# Patient Record
Sex: FEMALE | Race: WHITE
[De-identification: names, ages, dates, MRNs, and addresses within clinical notes are randomized per-mention and may not be internally consistent; named-entity substitution may affect disease eponyms.]

---

## 2024-08-27 PROBLEM — Z00.00 ENCOUNTER FOR PREVENTIVE HEALTH EXAMINATION: Status: ACTIVE | Noted: 2024-08-27

## 2024-09-10 PROBLEM — Z85.118: Status: RESOLVED | Noted: 2024-09-10 | Resolved: 2024-09-10

## 2024-09-10 PROBLEM — Z87.891 FORMER SMOKER: Status: ACTIVE | Noted: 2024-09-10

## 2024-09-10 PROBLEM — Z92.21 HISTORY OF CHEMOTHERAPY: Status: RESOLVED | Noted: 2024-09-10 | Resolved: 2024-09-10

## 2024-09-10 PROBLEM — Z92.3 HISTORY OF RADIATION THERAPY: Status: RESOLVED | Noted: 2024-09-10 | Resolved: 2024-09-10

## 2024-09-10 RX ORDER — MAGNESIUM HYDROXIDE 1200 MG
TABLET,CHEWABLE ORAL
Refills: 0 | Status: ACTIVE | COMMUNITY

## 2024-09-10 RX ORDER — PEMBROLIZUMAB 25 MG/ML
INJECTION, SOLUTION INTRAVENOUS
Refills: 0 | Status: ACTIVE | COMMUNITY

## 2024-09-10 RX ORDER — PEMETREXED 25 MG/ML
SOLUTION, CONCENTRATE INTRAVENOUS
Refills: 0 | Status: ACTIVE | COMMUNITY

## 2024-09-10 RX ORDER — CHROMIUM 200 MCG
TABLET ORAL
Refills: 0 | Status: ACTIVE | COMMUNITY

## 2024-09-10 RX ORDER — PREDNISONE 10 MG/1
10 TABLET ORAL
Refills: 0 | Status: ACTIVE | COMMUNITY

## 2024-09-10 RX ORDER — FLUTICASONE FUROATE, UMECLIDINIUM BROMIDE AND VILANTEROL TRIFENATATE 200; 62.5; 25 UG/1; UG/1; UG/1
POWDER RESPIRATORY (INHALATION)
Refills: 0 | Status: ACTIVE | COMMUNITY

## 2024-09-10 RX ORDER — ALBUTEROL 90 MCG
AEROSOL (GRAM) INHALATION
Refills: 0 | Status: ACTIVE | COMMUNITY

## 2024-09-10 RX ORDER — DEXTROMETHORPHAN POLISTIREX 30 MG/5 ML
SUSPENSION, EXTENDED RELEASE 12 HR ORAL
Refills: 0 | Status: ACTIVE | COMMUNITY

## 2024-09-10 RX ORDER — LACTOBACILLUS ACIDOPHILUS/PECT 30 MG-20MG
TABLET ORAL
Refills: 0 | Status: ACTIVE | COMMUNITY

## 2024-09-10 RX ORDER — PNV NO.95/FERROUS FUM/FOLIC AC 28MG-0.8MG
TABLET ORAL
Refills: 0 | Status: ACTIVE | COMMUNITY

## 2024-09-13 ENCOUNTER — APPOINTMENT (OUTPATIENT)
Dept: RADIATION ONCOLOGY | Facility: CLINIC | Age: 64
End: 2024-09-13
Payer: COMMERCIAL

## 2024-09-13 VITALS
OXYGEN SATURATION: 100 % | WEIGHT: 122 LBS | DIASTOLIC BLOOD PRESSURE: 87 MMHG | RESPIRATION RATE: 16 BRPM | HEART RATE: 71 BPM | HEIGHT: 64 IN | BODY MASS INDEX: 20.83 KG/M2 | SYSTOLIC BLOOD PRESSURE: 135 MMHG | TEMPERATURE: 98 F

## 2024-09-13 DIAGNOSIS — Z92.21 PERSONAL HISTORY OF ANTINEOPLASTIC CHEMOTHERAPY: ICD-10-CM

## 2024-09-13 DIAGNOSIS — Z92.3 PERSONAL HISTORY OF IRRADIATION: ICD-10-CM

## 2024-09-13 DIAGNOSIS — Z85.118 PERSONAL HISTORY OF OTHER MALIGNANT NEOPLASM OF BRONCHUS AND LUNG: ICD-10-CM

## 2024-09-13 DIAGNOSIS — Z87.891 PERSONAL HISTORY OF NICOTINE DEPENDENCE: ICD-10-CM

## 2024-09-13 PROCEDURE — 99205 OFFICE O/P NEW HI 60 MIN: CPT

## 2024-09-13 PROCEDURE — G2211 COMPLEX E/M VISIT ADD ON: CPT | Mod: NC

## 2024-09-13 NOTE — HISTORY OF PRESENT ILLNESS
[FreeTextEntry1] : Ms. 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 Patient was initially treated with carboplatin, pemetrexed, and pembrolizumab; now on maintenance pemetrexed and Keytruda.   Radiation history:  1. HEIDI & mediastinal lymph nodes - 32 fractions to a total dose of 6400 cGy completed on 8/7/23  2. Right occipital brain lesion - SRS 1 single fraction to a dose of 2200 CGy on 6/6/23  3. whole brain radiation - 10 fractions to a total dose of 3000 cGy completed on 2/2/24.   Patient has a history of 44-pack-year history, noted to have a new HEIDI nodule measuring 0.3cm and a stable 0.4cm nodule along the medial left major fissure with no associated lymphadenopathy on a screening CT scan in January 2021.   4/10/23 CT Chest ([facility]) showed multiple lung nodules including a new anterior HEIDI measuring 2.1 x 3.6cm, central HEIDI measuring 1.4 x 1.9cm significantly larger, posterior medial HEIDI 3x3mm slightly larger compared to previous exam. No significant mediastinal or hilar lymphadenopathy.    In April 2023, patient developed headaches. Her MRI on 4/17/23 showed a right occipital 1.3cm lesion with vasogenic edema.   5/1/23 Repeat MRI showed a 1.6cm enhancing mass in the right posterior parasaggital occipital region suspicious for metastasis.   5/3/23 PET/CT ([facility]) showed irregular masslike consolidation within the HEIDI with activity worrisome for malignancy. There was activity within the left hilum and subaortic lymph node.   5/10/23 HEIDI biopsy revealed adenocarcinoma.   Patient then received RT to the HEIDI & mediastinal lymph nodes completed in August 2023 and SRS to the right occipital brain lesion on 6/9/23.   9/27/23 MRI Brain ([facility]) showed resolved right occipital lobe enhancing metastasis. Trace residual enhancement region may reflect treatment-realted changes or trace residual neoplasm. Resolved surrounding edema. No new intracranial metastasis identified.   1/11/24 MRI Brain ([place]) showed multiple bilateral supratentorial and infratentorial subcentimeter enhancing nodular lesions, suspicious for metastases. These lesions measure up to 9mm in the left cerebellar hemisphere and 5mm in the parafalcine posterior left frontal lobe. The previously treated right occipital lobe metastasis has resolved.   Patient then received whole brain radiation 10 fractions to a total dose of 3000 cGy completed on 2/2/24.   5/13/24 MRI brain ([place]) demonstrated significantly decreased burden of intracranial metastases, with resolution of most lesions and decreased size of 2 residual cerebellar lesions. No new lesions identified.   8/14/24 MRI brain ([place]) showed new 3mm metastasis in the right recebellar hemisphere, and slightly increased size of a now 7mm metastasis in the left cerebellar hemisphere.   9/13/24 Ms. Sun presents today to discuss GKS to her brain as referred by Dr. Sanchez.  She presented today to the office and became very unsteady on her feet and felt like she was going to pass out. We got her a wheelchair and she felt fine. She had some water and cookies, VSS, and stated that she did not have any issues with balance or dizziness prior to the visit. She did have labs and chemo on Wednesday. She is overall feeling well and denies any headaches, dizziness, n/v, or vision issues. The patient felt well throughout the visit and then when she left, she felt like she was going to pass out when she was getting up to leave. We recommended a visit to the ED to be evaluated.

## 2024-09-13 NOTE — PHYSICAL EXAM
[] : no respiratory distress [Respiration, Rhythm And Depth] : normal respiratory rhythm and effort [Exaggerated Use Of Accessory Muscles For Inspiration] : no accessory muscle use [Abdomen Soft] : soft [Nondistended] : nondistended [Abdomen Tenderness] : non-tender [No Focal Deficits] : no focal deficits [Sensation] : the sensory exam was normal to light touch and pinprick [Motor Exam] : the motor exam was normal [Normal] : oriented to person, place and time, the affect was normal, the mood was normal and not anxious [de-identified] : no loss of balance on exam today; no positive cerebellar signs

## 2024-09-13 NOTE — REVIEW OF SYSTEMS
[Difficulty Walking] : difficulty walking [Negative] : Allergic/Immunologic [FreeTextEntry2] : lightheaded [de-identified] : occasional loss of balance

## 2024-09-13 NOTE — HISTORY OF PRESENT ILLNESS
[FreeTextEntry1] : Ms. 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 Patient was initially treated with carboplatin, pemetrexed, and pembrolizumab; now on maintenance pemetrexed and Keytruda.   Radiation history:  1. HEIDI & mediastinal lymph nodes - 32 fractions to a total dose of 6400 cGy completed on 8/7/23  2. Right occipital brain lesion - SRS 1 single fraction to a dose of 2200 CGy on 6/6/23  3. whole brain radiation - 10 fractions to a total dose of 3000 cGy completed on 2/2/24.   Patient has a history of 44-pack-year history, noted to have a new HEIDI nodule measuring 0.3cm and a stable 0.4cm nodule along the medial left major fissure with no associated lymphadenopathy on a screening CT scan in January 2021.   4/10/23 CT Chest ([facility] [place]) showed multiple lung nodules including a new anterior HEIDI measuring 2.1 x 3.6cm, central HEIDI measuring 1.4 x 1.9cm significantly larger, posterior medial HEIDI 3x3mm slightly larger compared to previous exam. No significant mediastinal or hilar lymphadenopathy.    In April 2023, patient developed headaches. Her MRI on 4/17/23 showed a right occipital 1.3cm lesion with vasogenic edema.   5/1/23 Repeat MRI showed a 1.6cm enhancing mass in the right posterior parasaggital occipital region suspicious for metastasis.   5/3/23 PET/CT ([facility] [place]) showed irregular masslike consolidation within the HEIDI with activity worrisome for malignancy. There was activity within the left hilum and subaortic lymph node.   5/10/23 HEIDI biopsy revealed adenocarcinoma.   Patient then received RT to the HEIDI & mediastinal lymph nodes completed in August 2023 and SRS to the right occipital brain lesion on 6/9/23.   9/27/23 MRI Brain ([facility] [place]) showed resolved right occipital lobe enhancing metastasis. Trace residual enhancement region may reflect treatment-realted changes or trace residual neoplasm. Resolved surrounding edema. No new intracranial metastasis identified.   1/11/24 MRI Brain ([place]) showed multiple bilateral supratentorial and infratentorial subcentimeter enhancing nodular lesions, suspicious for metastases. These lesions measure up to 9mm in the left cerebellar hemisphere and 5mm in the parafalcine posterior left frontal lobe. The previously treated right occipital lobe metastasis has resolved.   Patient then received whole brain radiation 10 fractions to a total dose of 3000 cGy completed on 2/2/24.   5/13/24 MRI brain ([place]) demonstrated significantly decreased burden of intracranial metastases, with resolution of most lesions and decreased size of 2 residual cerebellar lesions. No new lesions identified.   8/14/24 MRI brain ([place]) showed new 3mm metastasis in the right recebellar hemisphere, and slightly increased size of a now 7mm metastasis in the left cerebellar hemisphere.   9/13/24 Ms. Sun presents today to discuss GKS to her brain as referred by Dr. Sanchez.  She presented today to the office and became very unsteady on her feet and felt like she was going to pass out. We got her a wheelchair and she felt fine. She had some water and cookies, VSS, and stated that she did not have any issues with balance or dizziness prior to the visit. She did have labs and chemo on Wednesday. She is overall feeling well and denies any headaches, dizziness, n/v, or vision issues. The patient felt well throughout the visit and then when she left, she felt like she was going to pass out when she was getting up to leave. We recommended a visit to the ED to be evaluated.

## 2024-09-13 NOTE — REVIEW OF SYSTEMS
[Difficulty Walking] : difficulty walking [Negative] : Allergic/Immunologic [FreeTextEntry2] : lightheaded [de-identified] : occasional loss of balance

## 2024-09-13 NOTE — PHYSICAL EXAM
[] : no respiratory distress [Respiration, Rhythm And Depth] : normal respiratory rhythm and effort [Exaggerated Use Of Accessory Muscles For Inspiration] : no accessory muscle use [Abdomen Soft] : soft [Nondistended] : nondistended [Abdomen Tenderness] : non-tender [No Focal Deficits] : no focal deficits [Sensation] : the sensory exam was normal to light touch and pinprick [Motor Exam] : the motor exam was normal [Normal] : oriented to person, place and time, the affect was normal, the mood was normal and not anxious [de-identified] : no loss of balance on exam today; no positive cerebellar signs

## 2024-09-13 NOTE — VITALS
[Maximal Pain Intensity: 0/10] : 0/10 [70: Cares for self; unalbe to carry on normal activity or do active work.] : 70: Cares for self; unable to carry on normal activity or do active work. [Date: ____________] : Patient's last distress assessment performed on [unfilled]. [0 - No Distress] : Distress Level: 0

## 2024-09-18 ENCOUNTER — APPOINTMENT (OUTPATIENT)
Dept: NEUROSURGERY | Facility: CLINIC | Age: 64
End: 2024-09-18
Payer: COMMERCIAL

## 2024-09-18 DIAGNOSIS — C79.32 SECONDARY MALIGNANT NEOPLASM OF BRAIN: ICD-10-CM

## 2024-09-18 DIAGNOSIS — C79.31 SECONDARY MALIGNANT NEOPLASM OF BRAIN: ICD-10-CM

## 2024-09-18 PROCEDURE — G2211 COMPLEX E/M VISIT ADD ON: CPT | Mod: NC

## 2024-09-18 PROCEDURE — 99202 OFFICE O/P NEW SF 15 MIN: CPT

## 2024-09-19 PROBLEM — C79.31 SECONDARY MALIGNANT NEOPLASM OF BRAIN AND CEREBRAL MENINGES: Status: ACTIVE | Noted: 2024-09-13

## 2024-09-19 NOTE — END OF VISIT
[FreeTextEntry3] : I have seen the patient and reviewed the case together with PA and I agree with the final recommendations and plan of care.  Grant Ty MD Neurosurgery  [Time Spent: ___ minutes] : I have spent [unfilled] minutes of time on the encounter which excludes teaching and separately reported services.

## 2024-09-19 NOTE — ASSESSMENT
[FreeTextEntry1] : I have discussed the natural history and treatment options for brain metastatic disease with the patient. I explained the indications for medical management with antiepileptic medications and steroids, chemotherapy, radiation therapy, radiosurgery and surgery. I explained the indications of a combination of these treatment options. I discussed the risks, benefits, possible complications and expected outcome related to each treatment option.  In the end, I recommend Gamma Knife Radiosurgery to treat the patient's brain metastases. After considering the options, the patient is leaning towards treatment with Gamma Knife radiosurgery. I will coordinate with Dr. Nieves Dominguez, my partner in Radiation Oncology at [facility] to arrange for treatment. The patient understands the plan of care and is in agreement.

## 2024-09-19 NOTE — HISTORY OF PRESENT ILLNESS
[de-identified] : IGLESIA HUERTA is a 64 year female with a PMH of ex smoker, oligometastatic stage IV adenocarcinoma of the lung diagnosed in 2021 s/p RT to the left upper lobe and mediastinal lymph nodes in 8/2023 with metastases to the right occipital brain mass on 6/9/23 and WBRT in February 2024 (10 fractions to total dose of 3000 CGy). who presents to the office today at the request of Dr. Nieves Dominguez for neurosurgical consultation due to brain metastases Patient was initially treated with carboplatin, pemetrexed, and pembrolizumab; now on maintenance pemetrexed and Keytruda. The patient underwent MRI brain ([facility]) on 8/14/24 which was independently reviewed by me today and demonstrates a new 3mm metastasis in the right cerebellar hemisphere, and slightly increased size of a now 7mm metastasis in the left cerebellar hemisphere. The patient denies headaches, visual change, numbness, weakness of extremities, speech disturbance, dizziness, neck pain, vertigo. KPS: 70 Meds: calcium/vit D, dulcolax, folic acid, keytruda, pemetrexed, prednisone, proventil, trelegy, vit B Allergies: NKDA Soc Hx: exsmoker, no sig EtOH, lives with

## 2024-09-19 NOTE — HISTORY OF PRESENT ILLNESS
[de-identified] : IGLESIA HUERTA is a 64 year female with a PMH of ex smoker, oligometastatic stage IV adenocarcinoma of the lung diagnosed in 2021 s/p RT to the left upper lobe and mediastinal lymph nodes in 8/2023 with metastases to the right occipital brain mass on 6/9/23 and WBRT in February 2024 (10 fractions to total dose of 3000 CGy). who presents to the office today at the request of Dr. Nieves Dominguez for neurosurgical consultation due to brain metastases Patient was initially treated with carboplatin, pemetrexed, and pembrolizumab; now on maintenance pemetrexed and Keytruda. The patient underwent MRI brain ([facility]) on 8/14/24 which was independently reviewed by me today and demonstrates a new 3mm metastasis in the right cerebellar hemisphere, and slightly increased size of a now 7mm metastasis in the left cerebellar hemisphere. The patient denies headaches, visual change, numbness, weakness of extremities, speech disturbance, dizziness, neck pain, vertigo. KPS: 70 Meds: calcium/vit D, dulcolax, folic acid, keytruda, pemetrexed, prednisone, proventil, trelegy, vit B Allergies: NKDA Soc Hx: exsmoker, no sig EtOH, lives with

## 2024-09-19 NOTE — ASSESSMENT
[FreeTextEntry1] : I have discussed the natural history and treatment options for brain metastatic disease with the patient. I explained the indications for medical management with antiepileptic medications and steroids, chemotherapy, radiation therapy, radiosurgery and surgery. I explained the indications of a combination of these treatment options. I discussed the risks, benefits, possible complications and expected outcome related to each treatment option.  In the end, I recommend Gamma Knife Radiosurgery to treat the patient's brain metastases. After considering the options, the patient is leaning towards treatment with Gamma Knife radiosurgery. I will coordinate with Dr. Nieves Dominguez, my partner in Radiation Oncology at [facility] to arrange for treatment. The patient understands the plan of care and is in agreement.

## 2024-09-23 ENCOUNTER — RESULT REVIEW (OUTPATIENT)
Age: 64
End: 2024-09-23

## 2024-09-24 ENCOUNTER — TRANSCRIPTION ENCOUNTER (OUTPATIENT)
Age: 64
End: 2024-09-24

## 2024-09-24 ENCOUNTER — APPOINTMENT (OUTPATIENT)
Dept: NEUROSURGERY | Facility: HOSPITAL | Age: 64
End: 2024-09-24
Payer: COMMERCIAL

## 2024-09-24 PROCEDURE — 61797 SRS CRAN LES SIMPLE ADDL: CPT

## 2024-09-24 PROCEDURE — 61800 APPLY SRS HEADFRAME ADD-ON: CPT

## 2024-09-24 PROCEDURE — 61796 SRS CRANIAL LESION SIMPLE: CPT

## 2024-09-24 RX ORDER — DEXAMETHASONE 4 MG/1
4 TABLET ORAL
Qty: 20 | Refills: 0 | Status: ACTIVE | COMMUNITY
Start: 2024-09-24 | End: 1900-01-01

## 2024-09-24 NOTE — PROCEDURE
[FreeTextEntry1] : Gamma knife radiosurgery for multiple brain metastasis [FreeTextEntry2] : IGLESIA HUERTA is a 64 year female with a PMH of ex smoker, oligometastatic stage IV adenocarcinoma of the lung diagnosed in 2021 s/p RT to the left upper lobe and mediastinal lymph nodes in 8/2023 with metastases to the right occipital lobe S/P SRS on 6/9/23 and WBRT in February 2024 (10 fractions to total dose of 3000 CGy). who presented to the office at the request of Dr. Nieves Dominguez for neurosurgical consultation due to brain metastases Patient was initially treated with carboplatin, pemetrexed, and pembrolizumab; now on maintenance pemetrexed and Keytruda. The patient underwent MRI brain ([facility]) on 8/14/24 which was independently reviewed by me today and demonstrates new 3mm metastasis in the right cerebellar hemisphere, and slightly increased size of a now 7mm metastasis in the left cerebellar hemisphere. The patient denies headaches, visual change, numbness, weakness of extremities, speech disturbance, dizziness, neck pain, vertigo. KPS: 70 [FreeTextEntry3] :    PREOPERATIVE DIAGNOSIS: Brain metastasis from NSCLC   POSTOPERATIVE DIAGNOSIS: Same   OPERATION: Stereotactic frame application and gamma knife radiosurgery.   ANESTHESIA: Locally injected 1% Lidocaine with intravenous anxiolytic Versed.   SURGEON: Grant Ty MD. - Frame placement and planning, immediately available for all other aspects of case.   RADIATION ONCOLOGIST: Nieves Dominguez MD   ASSISTANTS: NONE   SPECIMEN: None   EBL: 0 ccs   OPERATIVE INDICATIONS: The full clinical history and indications for treatment were discussed in the initial neurosurgery visit note.  The indications, risks, benefits, and alternatives were discussed with the patient who asked us to proceed. The patient is aware that this may be one of several staged procedures in the management of this disorder.   OPERATIVE FINDINGS:  Successful targeting of three lesions   DESCRIPTION OF PROCEDURE: The patient was admitted to the [facility] where intravenous access was obtained.  The [place] stereotactic frame was placed with the use of intravenous sedation and local anesthetic.  The frame was placed without any difficulty and appropriate stereotactic measurements were made.  The patient then underwent stereotactic imaging.  The scans were loaded in the planning computer and Le[place]ell gamma plan was used to perform stereotactic radiosurgery dose planning.  The lesion was treated as follows:   Target Lt cerebe              Location: Lt cerebellum             Prescription: 20 Gy to the 49% local isodose line The plan covers 100% of the target.   Target Rt cerebe              Location: Rt cerebellum             Prescription: 20 Gy to the 46% local isodose line The plan covers 100% of the target.   Target  Lt Ft              Location: Lt Ft             Prescription:  20 Gy to the 54% local isodose line The plan covers 100% of the target.      After the usual  procedures were performed, stereotactic radiosurgery was delivered with use of the Gamma Knife. Following the completion of the last shot, the stereotactic frame was removed and the pin sites were dressed.   The Gamma Knife checklist and time outs were performed during this procedure.     Grant Ty MD Neurosurgery

## 2024-11-13 ENCOUNTER — APPOINTMENT (OUTPATIENT)
Dept: RADIATION ONCOLOGY | Facility: CLINIC | Age: 64
End: 2024-11-13
Payer: COMMERCIAL

## 2024-11-13 ENCOUNTER — APPOINTMENT (OUTPATIENT)
Dept: NEUROSURGERY | Facility: CLINIC | Age: 64
End: 2024-11-13
Payer: COMMERCIAL

## 2024-11-13 DIAGNOSIS — C79.32 SECONDARY MALIGNANT NEOPLASM OF BRAIN: ICD-10-CM

## 2024-11-13 DIAGNOSIS — C79.31 SECONDARY MALIGNANT NEOPLASM OF BRAIN: ICD-10-CM

## 2024-11-13 PROCEDURE — 99024 POSTOP FOLLOW-UP VISIT: CPT

## 2024-11-13 PROCEDURE — G2211 COMPLEX E/M VISIT ADD ON: CPT | Mod: NC

## 2024-11-13 PROCEDURE — 99213 OFFICE O/P EST LOW 20 MIN: CPT

## 2024-11-27 ENCOUNTER — TRANSCRIPTION ENCOUNTER (OUTPATIENT)
Age: 64
End: 2024-11-27

## 2025-02-13 ENCOUNTER — NON-APPOINTMENT (OUTPATIENT)
Age: 65
End: 2025-02-13

## 2025-02-19 ENCOUNTER — NON-APPOINTMENT (OUTPATIENT)
Age: 65
End: 2025-02-19

## 2025-02-19 ENCOUNTER — APPOINTMENT (OUTPATIENT)
Dept: RADIATION ONCOLOGY | Facility: CLINIC | Age: 65
End: 2025-02-19
Payer: COMMERCIAL

## 2025-02-19 PROCEDURE — G2211 COMPLEX E/M VISIT ADD ON: CPT | Mod: NC,95

## 2025-02-19 PROCEDURE — 99213 OFFICE O/P EST LOW 20 MIN: CPT | Mod: 95

## 2025-02-24 ENCOUNTER — APPOINTMENT (OUTPATIENT)
Dept: NEUROSURGERY | Facility: CLINIC | Age: 65
End: 2025-02-24
Payer: COMMERCIAL

## 2025-02-24 DIAGNOSIS — C79.32 SECONDARY MALIGNANT NEOPLASM OF BRAIN: ICD-10-CM

## 2025-02-24 DIAGNOSIS — C79.31 SECONDARY MALIGNANT NEOPLASM OF BRAIN: ICD-10-CM

## 2025-02-24 PROCEDURE — 99212 OFFICE O/P EST SF 10 MIN: CPT | Mod: 95

## 2025-05-05 ENCOUNTER — NON-APPOINTMENT (OUTPATIENT)
Age: 65
End: 2025-05-05

## 2025-05-07 ENCOUNTER — APPOINTMENT (OUTPATIENT)
Dept: NEUROSURGERY | Facility: CLINIC | Age: 65
End: 2025-05-07
Payer: COMMERCIAL

## 2025-05-07 DIAGNOSIS — C79.32 SECONDARY MALIGNANT NEOPLASM OF BRAIN: ICD-10-CM

## 2025-05-07 DIAGNOSIS — C79.31 SECONDARY MALIGNANT NEOPLASM OF BRAIN: ICD-10-CM

## 2025-05-07 PROCEDURE — 99212 OFFICE O/P EST SF 10 MIN: CPT | Mod: 95

## 2025-05-20 ENCOUNTER — APPOINTMENT (OUTPATIENT)
Dept: RADIATION ONCOLOGY | Facility: CLINIC | Age: 65
End: 2025-05-20
Payer: COMMERCIAL

## 2025-05-20 DIAGNOSIS — C79.31 SECONDARY MALIGNANT NEOPLASM OF BRAIN: ICD-10-CM

## 2025-05-20 DIAGNOSIS — C79.32 SECONDARY MALIGNANT NEOPLASM OF BRAIN: ICD-10-CM

## 2025-05-20 PROCEDURE — G2211 COMPLEX E/M VISIT ADD ON: CPT | Mod: NC,95

## 2025-05-20 PROCEDURE — 99215 OFFICE O/P EST HI 40 MIN: CPT | Mod: 95

## 2025-07-31 ENCOUNTER — TRANSCRIPTION ENCOUNTER (OUTPATIENT)
Age: 65
End: 2025-07-31

## 2025-09-03 ENCOUNTER — APPOINTMENT (OUTPATIENT)
Dept: NEUROSURGERY | Facility: CLINIC | Age: 65
End: 2025-09-03

## 2025-09-08 ENCOUNTER — APPOINTMENT (OUTPATIENT)
Dept: NEUROSURGERY | Facility: CLINIC | Age: 65
End: 2025-09-08
Payer: MEDICARE

## 2025-09-08 DIAGNOSIS — C79.31 SECONDARY MALIGNANT NEOPLASM OF BRAIN: ICD-10-CM

## 2025-09-08 DIAGNOSIS — C79.32 SECONDARY MALIGNANT NEOPLASM OF BRAIN: ICD-10-CM

## 2025-09-08 PROCEDURE — 99202 OFFICE O/P NEW SF 15 MIN: CPT | Mod: 2W

## 2025-09-17 ENCOUNTER — APPOINTMENT (OUTPATIENT)
Dept: RADIATION ONCOLOGY | Facility: CLINIC | Age: 65
End: 2025-09-17